# Patient Record
Sex: MALE | Race: WHITE | NOT HISPANIC OR LATINO | URBAN - METROPOLITAN AREA
[De-identification: names, ages, dates, MRNs, and addresses within clinical notes are randomized per-mention and may not be internally consistent; named-entity substitution may affect disease eponyms.]

---

## 2018-08-12 ENCOUNTER — HOSPITAL ENCOUNTER (EMERGENCY)
Facility: MEDICAL CENTER | Age: 28
End: 2018-08-14
Attending: EMERGENCY MEDICINE
Payer: MEDICAID

## 2018-08-12 DIAGNOSIS — T14.91XA SUICIDE ATTEMPT (HCC): ICD-10-CM

## 2018-08-12 DIAGNOSIS — R45.851 SUICIDAL IDEATION: ICD-10-CM

## 2018-08-12 PROCEDURE — 90791 PSYCH DIAGNOSTIC EVALUATION: CPT

## 2018-08-12 PROCEDURE — 302970 POC BREATHALIZER: Performed by: EMERGENCY MEDICINE

## 2018-08-12 PROCEDURE — 99285 EMERGENCY DEPT VISIT HI MDM: CPT

## 2018-08-12 RX ORDER — LITHIUM CARBONATE 300 MG
300 TABLET ORAL 3 TIMES DAILY
Status: DISCONTINUED | OUTPATIENT
Start: 2018-08-13 | End: 2018-08-13

## 2018-08-12 RX ORDER — RISPERIDONE 1 MG/1
1 TABLET ORAL 2 TIMES DAILY
Status: DISCONTINUED | OUTPATIENT
Start: 2018-08-12 | End: 2018-08-13

## 2018-08-13 LAB
AMPHET UR QL SCN: NEGATIVE
BARBITURATES UR QL SCN: NEGATIVE
BENZODIAZ UR QL SCN: NEGATIVE
BZE UR QL SCN: NEGATIVE
CANNABINOIDS UR QL SCN: NEGATIVE
LITHIUM SERPL-MCNC: <0.1 MMOL/L (ref 0.6–1.2)
METHADONE UR QL SCN: NEGATIVE
OPIATES UR QL SCN: NEGATIVE
OXYCODONE UR QL SCN: NEGATIVE
PCP UR QL SCN: NEGATIVE
POC BREATHALIZER: 0.02 PERCENT (ref 0–0.01)
PROPOXYPH UR QL SCN: NEGATIVE

## 2018-08-13 PROCEDURE — 80307 DRUG TEST PRSMV CHEM ANLYZR: CPT

## 2018-08-13 PROCEDURE — A9270 NON-COVERED ITEM OR SERVICE: HCPCS | Performed by: EMERGENCY MEDICINE

## 2018-08-13 PROCEDURE — 80178 ASSAY OF LITHIUM: CPT

## 2018-08-13 PROCEDURE — 700102 HCHG RX REV CODE 250 W/ 637 OVERRIDE(OP): Performed by: EMERGENCY MEDICINE

## 2018-08-13 PROCEDURE — 99245 OFF/OP CONSLTJ NEW/EST HI 55: CPT | Performed by: PSYCHIATRY & NEUROLOGY

## 2018-08-13 RX ORDER — CLONIDINE HYDROCHLORIDE 0.1 MG/1
0.1 TABLET ORAL DAILY
COMMUNITY
End: 2018-08-13

## 2018-08-13 RX ORDER — FLUOXETINE HYDROCHLORIDE 20 MG/1
40 CAPSULE ORAL 2 TIMES DAILY
COMMUNITY
End: 2018-08-13

## 2018-08-13 RX ORDER — RISPERIDONE 2 MG/1
4 TABLET ORAL EVERY EVENING
Status: DISCONTINUED | OUTPATIENT
Start: 2018-08-13 | End: 2018-08-14

## 2018-08-13 RX ORDER — LITHIUM CARBONATE 300 MG
400 TABLET ORAL 2 TIMES DAILY
COMMUNITY
End: 2018-08-13

## 2018-08-13 RX ORDER — PRAZOSIN HYDROCHLORIDE 1 MG/1
2 CAPSULE ORAL NIGHTLY
COMMUNITY
End: 2018-08-13

## 2018-08-13 RX ORDER — RISPERIDONE 2 MG/1
2 TABLET ORAL
COMMUNITY
End: 2018-08-13

## 2018-08-13 RX ORDER — FLUOXETINE HYDROCHLORIDE 20 MG/1
60 CAPSULE ORAL DAILY
Status: DISCONTINUED | OUTPATIENT
Start: 2018-08-13 | End: 2018-08-14

## 2018-08-13 RX ORDER — CLONIDINE HYDROCHLORIDE 0.1 MG/1
0.1 TABLET ORAL
Status: DISCONTINUED | OUTPATIENT
Start: 2018-08-13 | End: 2018-08-14 | Stop reason: HOSPADM

## 2018-08-13 RX ORDER — LITHIUM CARBONATE 300 MG
600 TABLET ORAL 2 TIMES DAILY
Status: DISCONTINUED | OUTPATIENT
Start: 2018-08-13 | End: 2018-08-14

## 2018-08-13 RX ORDER — IBUPROFEN 600 MG/1
600 TABLET ORAL ONCE
Status: COMPLETED | OUTPATIENT
Start: 2018-08-13 | End: 2018-08-13

## 2018-08-13 RX ORDER — PRAZOSIN HYDROCHLORIDE 2 MG/1
2 CAPSULE ORAL EVERY EVENING
Status: DISCONTINUED | OUTPATIENT
Start: 2018-08-13 | End: 2018-08-13

## 2018-08-13 RX ADMIN — RISPERIDONE 4 MG: 2 TABLET ORAL at 18:18

## 2018-08-13 RX ADMIN — LITHIUM CARBONATE 600 MG: 300 TABLET ORAL at 18:17

## 2018-08-13 RX ADMIN — CLONIDINE HYDROCHLORIDE 0.1 MG: 0.1 TABLET ORAL at 01:09

## 2018-08-13 RX ADMIN — IBUPROFEN 600 MG: 600 TABLET, FILM COATED ORAL at 22:00

## 2018-08-13 RX ADMIN — CLONIDINE HYDROCHLORIDE 0.1 MG: 0.1 TABLET ORAL at 21:00

## 2018-08-13 RX ADMIN — FLUOXETINE HYDROCHLORIDE 60 MG: 20 CAPSULE ORAL at 01:09

## 2018-08-13 RX ADMIN — PRAZOSIN HYDROCHLORIDE 6 MG: 5 CAPSULE ORAL at 18:19

## 2018-08-13 ASSESSMENT — PAIN SCALES - GENERAL: PAINLEVEL_OUTOF10: 6

## 2018-08-13 NOTE — DISCHARGE PLANNING
Medical Social Work    Referral: Legal Hold    Intervention: Legal Hold Paperwork given to SW by AMEEW: Viridiana    Legal Hold Initiated: Date: 08/12/2018  Time: 1132    Legal Hold faxed: Date: 08/13/2018  Time: 1322    Patient’s Insurance Listed on Face Sheet: None    Referrals sent to: [facility]    Plan: Patient will transfer to mental health facility once acceptance is obtained.     Confirmation of receipt of referral by phone with: fax confirmation

## 2018-08-13 NOTE — ED PROVIDER NOTES
ED Provider Note    Scribed for Augustine Weiner M.D. by Augustine Weiner. 8/12/2018,  11:42 PM.    CHIEF COMPLAINT  Chief Complaint   Patient presents with   • Suicidal Ideation   • Suicide Attempt       HPI  Myles Schultz is a 27 y.o. male with a history of severe psychiatric illness and depression  who is been off his lithium and Risperdal for 2 weeks because he did not think they were working who presents to the Emergency Department accompanied by his mother, stating that he had a clear plan to kill himself by trying to hang himself with an extension cord.  He denies wanting to hurt anyone else.  He denies any hallucinations.  He appears extremely depressed.  He is not making eye contact with the psychiatric evaluator or with me.  He replies to questions and delayed and monosyllabic responses.  He had an unpleasant breakup with a girlfriend who had been living in [facility] for the last 4 years.  He reports worsening depression, and was attempting to hang himself with an extension cord, but he was discovered by his mother who brought him to the hospital.  He continues to feel suicidal.  He has some very subtle red marks around his neck.    REVIEW OF SYSTEMS  See HPI for further details. All other systems are negative.     PAST MEDICAL HISTORY   has a past medical history of Psychiatric disorder.    SOCIAL HISTORY  Social History     Social History Main Topics   • Smoking status: Current Every Day Smoker   • Smokeless tobacco: Never Used   • Alcohol use Yes      Comment: daily 12 pack   • Drug use: No   • Sexual activity: Not on file     History   Drug Use No       SURGICAL HISTORY   has a past surgical history that includes appendectomy and other orthopedic surgery.    CURRENT MEDICATIONS  Home Medications    **Home medications have not yet been reviewed for this encounter**         ALLERGIES  No Known Allergies    PHYSICAL EXAM  VITAL SIGNS: /86   Pulse 97   Temp 36.1 °C (97 °F)   Resp  "16   Ht 1.88 m (6' 2\")   Wt 117.1 kg (258 lb 2.5 oz)   SpO2 98%   BMI 33.15 kg/m²   Pulse ox interpretation: I interpret this pulse ox as normal.      PHYSICAL EXAM  VITAL SIGNS: /86   Pulse 97   Temp 36.1 °C (97 °F)   Resp 16   Ht 1.88 m (6' 2\")   Wt 117.1 kg (258 lb 2.5 oz)   SpO2 98%   BMI 33.15 kg/m²   Pulse ox interpretation: I interpret this pulse ox as normal.  Constitutional: Alert in no apparent distress.  Appears severely depressed.  HENT: No signs of trauma, Bilateral external ears normal, Nose normal.   Eyes: Pupils are equal and reactive, Conjunctiva normal, Non-icteric.   Neck: Normal range of motion, No tenderness, Supple, No stridor.    Cardiovascular: Normal peripheral perfusion  Thorax & Lungs: Unlabored respirations, equal chest expansion, no accessory muscle use  Abdomen: Non-distended  Skin:  No erythema, No rash.   Back: Normal alignment and ROM  Extremities: No gross deformity  Musculoskeletal: Good range of motion in all major joints.   Neurologic: Alert, Normal motor function, No focal deficits noted.   Psychiatric: Affect flat, withdrawn, depressed, judgment fair, no homicidal thoughts.  No hallucinations or delusions.      DIAGNOSTIC STUDIES / PROCEDURES      LABS  Labs Reviewed   LITHIUM   URINE DRUG SCREEN   POC BREATHALIZER     All labs reviewed by me.    RADIOLOGY  No orders to display     The radiologist's interpretation of all radiological studies have been reviewed by me.    COURSE & MEDICAL DECISION MAKING  Nursing notes, VS, PMSFHx reviewed in chart.     11:42 PM Patient seen and examined at bedside.  We have confirmed his home medication doses over the phone with his mother who reviewed the bottles of the patient's home medications.  He is obviously severely depressed and off his medications and maintains continued suicidal thoughts.  He will be kept safe here until he can be transferred to inpatient psychiatric treatment.    DISPOSITION:  Patient will be " transferred to my partner Dr. Alfredo Brennan for continued observation and safety until he can be transferred to inpatient treatment.      FINAL IMPRESSION  1. Suicidal ideation    2. Suicide attempt (HCC)

## 2018-08-13 NOTE — CONSULTS
"[facility]   TRIAGE ASSESSMENT    Name: Myles Schultz  MRN: [id number]  : 1990  Age: 27 y.o.  Date of assessment: 2018  PCP: Carlitos Richter M.D.  Persons in attendance: Patient    CHIEF COMPLAINT/PRESENTING ISSUE (as stated by Patient): This is a 27 year old male seen seated on hospital chair, slumped over, no eye contact, with  impoverished speech, and flat affect. Patient reports experiencing depression for years. Patient states, \"nothing was getting better\". Patient reports going through a break up with his fiance two weeks ago. Patient states his former fiance \"forced me to pack up my things in a [employer], and flew my other out and forced her to drive me back to [place]\".  Patient reports severe depression and tonight attempted to hang himself with an extension cord but was discovered by his mother who transported him to the hospital. Patient continues to endorse suicidal ideations at this time. Patient has a visible red cooper around his neck. Has not been taking psychotropic medications x 2 weeks. Denies thoughts of harming others.  Patient denies auditory or visual hallucinations.   Chief Complaint   Patient presents with   • Suicidal Ideation   • Suicide Attempt        CURRENT LIVING SITUATION/SOCIAL SUPPORT: Patient resides with his mother. Never , no children. Patient is currently unemployed and has no income.     BEHAVIORAL HEALTH TREATMENT HISTORY  Does patient/parent report a history of prior behavioral health treatment for patient?   Yes:    Dates Level of Care Facilty/Provider Diagnosis/Problem Medications   2018 Outpatient counseling [facility] depression Lithium  Risperdal                                                                          SAFETY ASSESSMENT - SELF  Does patient acknowledge current or past symptoms of dangerousness to self? yes  Does parent/significant other report patient has current or past symptoms of dangerousness to self? N\A  Does " presenting problem suggest symptoms of dangerousness to self? Yes:     Past Current    Suicidal Thoughts: [x]  [x]    Suicidal Plans: []  []    Suicidal Intent: []  []    Suicide Attempts: [x]  [x]    Self-Injury [x]  [x]      For any boxes checked above, provide detail: Patient reports attempting to hang himself today using an extension cord. Patient reports previous attempts to harm self through cutting.     History of suicide by family member: yes - Grandfather hung himself, mother suffers from depression  History of suicide by friend/significant other: no  Recent change in frequency/specificity/intensity of suicidal thoughts or self-harm behavior? yes -   Current access to firearms, medications, or other identified means of suicide/self-harm? yes -   If yes, willing to restrict access to means of suicide/self-harm? yes -   Protective factors present:  Willing to address in treatment    SAFETY ASSESSMENT - OTHERS  Does patient acknowledge current or past symptoms of aggressive behavior or risk to others? no  Does parent/significant other report patient has current or past symptoms of aggressive behavior or risk to others?  N\A  Does presenting problem suggest symptoms of dangerousness to others? No    Crisis Safety Plan completed and copy given to patient? no    ABUSE/NEGLECT SCREENING  Does patient report feeling “unsafe” in his/her home, or afraid of anyone?  no  Does patient report any history of physical, sexual, or emotional abuse?  no  Does parent or significant other report any of the above? N\A  Is there evidence of neglect by self?  no  Is there evidence of neglect by a caregiver? no  Does the patient/parent report any history of CPS/APS/police involvement related to suspected abuse/neglect or domestic violence? no  Based on the information provided during the current assessment, is a mandated report of suspected abuse/neglect being made?  No    SUBSTANCE USE SCREENING  Yes:  Kj all substances used in  "the past 30 days:      Last Use Amount   [x]   Alcohol today    []   Marijuana     []   Heroin     []   Prescription Opioids  (used without prescription, for    recreation, or in excess of prescribed amount)     []   Other Prescription  (used without prescription, for    recreation, or in excess of prescribed amount)     []   Cocaine      []   Methamphetamine     []   \"\" drugs (ectasy, MDMA)     []   Other substances        UDS results: pending  Breathalyzer results: pending    What consequences does the patient associate with any of the above substance use and or addictive behaviors? None    Risk factors for detox (check all that apply):  []  Seizures   []  Diaphoretic (sweating)   []  Tremors   []  Hallucinations   []  Increased blood pressure   []  Decreased blood pressure   []  Other   [x]  None      [x] Patient education on risk factors for detoxification and instructed to return to ER as needed.      MENTAL STATUS   Participation: Limited verbal participation  Grooming: Casual  Orientation: Alert  Behavior: Calm  Eye contact: Poor  Mood: Depressed  Affect: Flat  Thought process: Goal-directed  Thought content: Within normal limits  Speech: Soft , latency in speech  Perception: Within normal limits  Memory:  No gross evidence of memory deficits  Insight: Limited  Judgment:  Limited  Other:    Collateral information:   Source:  [] Significant other present in person:   [x] Significant other by telephone  Mother Mariluz [phone]  [] [facility]   [x] [facility]  [x] [facility]  [] Other:     [] Unable to complete full assessment due to:  [] Acute intoxication  [] Patient declined to participate/engage  [] Patient verbally unresponsive  [] Significant cognitive deficits  [] Significant perceptual distortions or behavioral disorganization  [] Other:      CLINICAL IMPRESSIONS:  Primary:  Major Depression  Secondary:  deferred       IDENTIFIED NEEDS/PLAN:  [Trigger " DISPOSITION list for any items marked]    [x]  Imminent safety risk - self [] Imminent safety risk - others   []  Acute substance withdrawal []  Psychosis/Impaired reality testing   []  Mood/anxiety []  Substance use/Addictive behavior   []  Maladaptive behaviro []  Parent/child conflict   []  Family/Couples conflict []  Biomedical   []  Housing []  Financial   []   Legal  Occupational/Educational   []  Domestic violence []  Other:     Disposition: Refer to Legal [facility], [facility], [facility], [facility] and [facility]    Does patient express agreement with the above plan? yes    Referral appointment(s) scheduled? N\A    Alert team only:   I have discussed findings and recommendations with Dr. Weiner who is in agreement with these recommendations.     Referral information sent to the following community providers :    If applicable : Referred  to : [facility] for legal hold follow up at (time): 12:20 abrock Brown, Ph.D.  8/12/2018

## 2018-08-13 NOTE — ED NOTES
Personal belongings checked at bedside by security. Cigarettes and lighter confiscated. One bag of belongings placed in locker 10.

## 2018-08-13 NOTE — ED TRIAGE NOTES
"Pt states \"want to kill myself just tried by hanging myself with extension cord\"  Pt denies hi, a/v hallucinations, second attempt.  Has been off med's lithium and Risperdal for 2 weeks, \" didn't believe they were working\". Pt c/o of high anxiety at this time.  Airway patent red marks right left side around collar.  Mom kranthi in triage with pt, with permission and also has permission to call in and get updates..  Pt tearful at time, answers questions appropriately withdrawn  "

## 2018-08-13 NOTE — ED NOTES
Med Rec completed per patient and [facility] in [place]  Allergies reviewed  No ORAL antibiotics in last 30 days    Patient has been of his medications for a couple weeks but called pharmacy for information:  Clonidine 0.1mg once daily at bedtime  Fluoxitine 20 mg take 60 mg once daily  Lithium 300 mg take 2 capsules twice daily  Prazosin 1 mg and 5 mg to take a total of 6 mg at bedtime  Risperidone 4 mg once daily at bedtime

## 2018-08-13 NOTE — DISCHARGE PLANNING
Alert Team Note: Per patient's permission, contacted mother Liliana at [phone] who confirmed patient's history. Received list of home medications from mother with dosages, informed medical staff.  Patient awaits psychiatric hospitalization    Viridiana Brown, Ph.D.  Alert Team Therapist

## 2018-08-13 NOTE — ED NOTES
Patient's home medications have been reviewed by the pharmacy team.     Past Medical History:   Diagnosis Date   • Psychiatric disorder        Patient's Medications   New Prescriptions    No medications on file   Previous Medications    No medications on file   Modified Medications    No medications on file   Discontinued Medications    CLONIDINE (CATAPRES) 0.1 MG TAB    Take 0.1 mg by mouth every day.    FLUOXETINE (PROZAC) 20 MG CAP    Take 40 mg by mouth 2 times a day.    LITHIUM (ESKALITH) 300 MG TAB    Take 400 mg by mouth 2 Times a Day.    PRAZOSIN (MINIPRESS) 1 MG CAP    Take 2 mg by mouth every evening.    RISPERIDONE (RISPERDAL) 2 MG TAB    Take 2 mg by mouth every bedtime.          A:  Patient states he has been out of medications for the past 2 weeks.  Medications do not appear to be contributing to current complaints.       P:    No recommendations at this time. Home medications have been reordered as appropriate.  Discussed with Dr. Looney regarding home does of prazosin, ok to increase to home dosing regimen of prazosin 6 mg po QHS.      Chiquis Odom, EdwinD., BCPS

## 2018-08-13 NOTE — ED PROVIDER NOTES
ED Provider Note    11:10 AM patient care signed out from nighttime ERP.  No events under my care.  Pharmacist has reconciled and altered the patient's medications due to a dosing discrepancy.  Patient sitting up, he appears comfortable.  He has no requests and no complaints.  I encouraged him to make his needs known.  Sitter outside the room.  Patient care will be transferred to oncoming ERP at close of my shift.

## 2018-08-14 VITALS
TEMPERATURE: 98.1 F | OXYGEN SATURATION: 95 % | HEART RATE: 64 BPM | WEIGHT: 258.16 LBS | BODY MASS INDEX: 33.13 KG/M2 | DIASTOLIC BLOOD PRESSURE: 87 MMHG | RESPIRATION RATE: 16 BRPM | HEIGHT: 74 IN | SYSTOLIC BLOOD PRESSURE: 125 MMHG

## 2018-08-14 PROCEDURE — A9270 NON-COVERED ITEM OR SERVICE: HCPCS | Performed by: PSYCHIATRY & NEUROLOGY

## 2018-08-14 PROCEDURE — 99284 EMERGENCY DEPT VISIT MOD MDM: CPT | Performed by: PSYCHIATRY & NEUROLOGY

## 2018-08-14 PROCEDURE — A9270 NON-COVERED ITEM OR SERVICE: HCPCS | Performed by: EMERGENCY MEDICINE

## 2018-08-14 PROCEDURE — 700102 HCHG RX REV CODE 250 W/ 637 OVERRIDE(OP): Performed by: EMERGENCY MEDICINE

## 2018-08-14 PROCEDURE — 700102 HCHG RX REV CODE 250 W/ 637 OVERRIDE(OP): Performed by: PSYCHIATRY & NEUROLOGY

## 2018-08-14 RX ORDER — FLUOXETINE HYDROCHLORIDE 20 MG/1
20 CAPSULE ORAL DAILY
Status: DISCONTINUED | OUTPATIENT
Start: 2018-08-15 | End: 2018-08-14 | Stop reason: HOSPADM

## 2018-08-14 RX ORDER — QUETIAPINE FUMARATE 100 MG/1
100 TABLET, FILM COATED ORAL EVERY EVENING
Status: DISCONTINUED | OUTPATIENT
Start: 2018-08-14 | End: 2018-08-14 | Stop reason: HOSPADM

## 2018-08-14 RX ORDER — QUETIAPINE FUMARATE 25 MG/1
50 TABLET, FILM COATED ORAL 2 TIMES DAILY
Status: DISCONTINUED | OUTPATIENT
Start: 2018-08-14 | End: 2018-08-14 | Stop reason: HOSPADM

## 2018-08-14 RX ADMIN — QUETIAPINE FUMARATE 50 MG: 25 TABLET ORAL at 15:38

## 2018-08-14 RX ADMIN — FLUOXETINE HYDROCHLORIDE 60 MG: 20 CAPSULE ORAL at 10:08

## 2018-08-14 RX ADMIN — QUETIAPINE FUMARATE 100 MG: 100 TABLET ORAL at 19:18

## 2018-08-14 RX ADMIN — LITHIUM CARBONATE 600 MG: 300 TABLET ORAL at 10:06

## 2018-08-14 ASSESSMENT — LIFESTYLE VARIABLES: DO YOU DRINK ALCOHOL: NO

## 2018-08-14 NOTE — ED NOTES
Patient asleep, no acute distress noted at this time, rr equal and unlabored, sitter outside room with view of patient

## 2018-08-14 NOTE — ED NOTES
no changes, pt remains harm free, pt is sleeping in bed with visible chest rise and falls. Sitter outside room.

## 2018-08-14 NOTE — PSYCHIATRY
"PSYCHIATRIC CONSULTATION:  *Reason for admission:history of severe psychiatric illness and depression  who is been off his lithium and Risperdal for 2 weeks because he did not think they were working who presents to the Emergency Department accompanied by his mother, stating that he had a clear plan to kill himself by trying to hang himself with an extension cord.      *Reason for consult:suicidal   *Requesting Physician:Augustine Weiner M.D.      Legal status:  +    *Chief Complaint: suicidal    *HPI: 28 yo male with depression for years but much worse 2 weeks ago after breakup with GF because of his depression. He now has decrease appetite with 10 lb wt loss, difficulty getting to sleep, ruminates, guilt, decreased energy, hopeless, anhedonia and is SI but not HI.  Depression is a 10.  He is anxious \"all the time\" and has unpredictable panic attacks with SOB, sweating, CP. No psychosis.        Was preparing to hang when mom walked in. Thinking about it for days.     *Medical Review of Systems: as reported by pt. All systems reviewed. Only those found to be + are noted below. All others are negative.   Neurological:    TBIs:denies   SZs:denies   Strokes:denies  Other medical symptoms:lower back pain from an injury. Ibuprofen 800 mg does not work.      *Past Medical Hx: as noted.    *Family Medical/Psychiatric Hx:depression and SAs and SI'ds. Alcohol. No drugs.    *Past Psychiatric Hx:no hospitalizations, hx of bipolar dx but pt cannot provide symptoms of sam in past. Denies hx of psychosis. Doesn't think current meds work. Has been lithium, risperdol. Denies SAs until now.    *Social Hx:currently living with mom. Some college. Was working but when he broke up, he came to [employer] and his GF turned in his uniform and thus \"made me quit\".    *Drug/Alcohol/Tobacco Hx:   Drugs:denies   Alcohol:12 pack daily   Tobacco:+    *Psychiatric (Physical) Examination: observed phenomenon:  *Vitals:Blood pressure 128/79, pulse " "76, temperature 36.9 °C (98.4 °F), resp. rate 16, height 1.88 m (6' 2\"), weight 117.1 kg (258 lb 2.5 oz), SpO2 98 %.  *Appearance/Attitude:clean, good eye contact, barely cooperative. Normal habitus.  *Muscle Strength/abnormal movements: none, little spontaneous movement  *Tone/Gait/Station:not observed  *Speech:answers only what is asked, paucity  *Thought Process/Associations: linear  *Abnormal/Psychotic Thoughts (ex): none, denies  *Insight/Judgement:fair  *Orientation: x 4  *Memory:intact  *Attention/Concentration:intact  *Language: paucity, fluid  *Fund of Knowledge:not tested  *Mood: depressed, anxious  *Affect: blunted    *SI/HI:  +SI, neg HI    Medical Hx: labs, MARS, medications, etc were reviewed. Only those findings of potential interest to psychiatry are noted below:  Medical Conditions:  None acutely   Allergies: Patient has no known allergies.    Medications (currently prescribed at [facility]):clondine 0.1 hs, prozac 60 mg, lithium 600 mg bid, prozosin 6 mh hs, risperdol 4 mg hs  Labs:Results for KEYSHA HAMMOND (MRN [id number]) as of 8/14/2018 10:06   Ref. Range 8/13/2018 04:32   POC Breathalizer Latest Ref Range: 0.00 - 0.01 Percent 0.022 (A)   UDS negative  Results for KEYSHA HAMMOND (MRN [id number]) as of 8/14/2018 10:06   Ref. Range 8/13/2018 00:20   Lithium Latest Ref Range: 0.6 - 1.2 mmol/L <0.1 (L)     ECG:none     *ASSESSMENT: ( acute, chronic, acute on chronic, complicated, stable, resolved)  Major Depressive disorder Unsepc: severe. R/O bipolar depression without psychosis  Anxiety disorder Unsepc: R/O secondary to depression  Alcohol Use Disorder: moderate to severe.    *Plan/Further Workup: dc lithium, and risperdol. Start lexapro 10 mg, decrease prozac (cross taper) to 20 mg for now, start seroquel 50 mg bid and 100 mg hs for anxiety and sleep.    Legal status: +       Eligible for transfer to [place] [id number] (ED only): YES     Will follow   Thank you for the consult.     "

## 2018-08-14 NOTE — ED NOTES
Assumed care of patient, report from Omid KING. [facility] at bedside[facility]

## 2018-08-14 NOTE — DISCHARGE PLANNING
Alert Team  Pt minimal with answers; denies all psych symptoms.  Continues to await inpatient psychiatric placement.

## 2018-08-14 NOTE — PSYCHIATRY
"PSYCHIATRIC FOLLOW-UP:  *Reason for admission:history of severe psychiatric illness and depression  who is been off his lithium and Risperdal for 2 weeks because he did not think they were working who presents to the Emergency Department accompanied by his mother, stating that he had a clear plan to kill himself by trying to hang himself with an extension cord.       Legal Hold Status: +    Slept. No side effects. Wants to go home because he will be better at home. The reason he is better is because he \"needed a break\". Asked from what? No clear reply. He hesitated when asked about SI. Speaks only if asked and then with as few words as possible.    *Psychiatric (Physical) Examination: observed phenomenon:  *Vitals:Blood pressure 135/78, pulse 66, temperature 36.6 °C (97.8 °F), resp. rate 12, height 1.88 m (6' 2\"), weight 117.1 kg (258 lb 2.5 oz), SpO2 97 %.  *Appearance/Attitude:clean, good eye contact, barely cooperative. Normal habitus.  *Muscle Strength/abnormal movements: none, little spontaneous movement  *Tone/Gait/Station:not observed  *Speech:answers only what is asked, paucity  *Thought Process/Associations: linear  *Abnormal/Psychotic Thoughts (ex): none, denies  *Insight/Judgement:fair  *Orientation: x 4  *Memory:intact  *Attention/Concentration:intact  *Language: paucity, fluid  *Fund of Knowledge:not tested  *Mood: depressed, anxious though presumably less so  *Affect: blunted    *SI/HI:  neg SI, neg HI     *ASSESSMENT: ( acute, chronic, acute on chronic, complicated, stable, resolved)  Major Depressive disorder Unsepc: severe. R/O bipolar depression without psychosis  Anxiety disorder Unsepc: R/O secondary to depression  Alcohol Use Disorder: moderate to severe.     *Plan/Further Workup: pt is not appreciably better.   Legal status: extended   Eligible for transfer to [place] [address] (ED only): yes. Sitter in room.     Will follow   "

## 2018-08-14 NOTE — ED PROVIDER NOTES
ED Provider Note Addendum    Scribed for Pancho Lozano M.D. by Nichole Thomas on 8/13/2018 at 6:03 PM.     This is an addendum to the note on Myles Schultz.  For further details and full chart information, see patient's initial note.       12:00 PM - I discussed the patient's case with Dr. Looney ([facility]) who will transfer care of the patient to me at this time.        6:03 PM  - Patient evaluated by myself.  Patient is resting comfortably at this time with no complaints. He does not want water at this time.     Disposition:  Patient will be transferred to an appropriate psychiatric facility in stable condition for further psychiatric care and evaluation.  Patient will be placed under the care of my partner awaiting transfer.    FINAL IMPRESSION  1. Suicidal ideation    2. Suicide attempt (HCC)         Nichole YO (Scribe), am scribing for, and in the presence of, Pancho Lozano M.D..    Electronically signed by: Nichole Thomas (Scribe), 8/13/2018    Pancho YO M.D. personally performed the services described in this documentation, as scribed by Nichole Thomas in my presence, and it is both accurate and complete.    The note accurately reflects work and decisions made by me.  Pancho Lozano  8/13/2018  8:05 PM

## 2018-08-14 NOTE — ED PROVIDER NOTES
1215 -patient reevaluated.  Patient is on a legal hold, waiting transfer to psychiatric facility when a bed is available.  Please refer to initial note for complete details.  No concerns overnight.  Patient has tolerated breakfast and ambulates independently to the bathroom.  Nursing staff has heard from [facility], anticipate admission later this afternoon.    FINAL IMPRESSION  (R45.851) Suicidal ideation  (T14.91XA) Suicide attempt (HCC)      Electronically signed by: Susy Horowitz, 8/14/2018 1:13 PM    This dictation was created using voice recognition software. The accuracy of the dictation is limited to the abilities of the software. I expect there may be some errors of grammar and possibly content. The nursing notes were reviewed and certain aspects of this information were incorporated into this note.

## 2018-08-14 NOTE — ED NOTES
Received report and assumed care of pt. Pt sleeping, respirations even and unlabored. 1:1 sitter monitoring pt, room stripped of all medically unnecessary and dangerous equpiment.

## 2018-08-14 NOTE — ED NOTES
Resting in bed. Continues to present with flat affect and responds to questions with only one-two words. Denies needs at this time but continues to refuse lunch. Medicated per MAR and he did drink full glass of water with pills. VSS.

## 2018-08-15 NOTE — ED NOTES
[facility] here for transfer. Given transfer paperwork and patient belongings (one bag with clothes, wallet and cell phone). Called mother, Mariluz, and updated on plan for transfer to [facility]. Ambulatory to ambulance bay with EMS crew.

## 2018-08-15 NOTE — ED NOTES
Assist RN: patient is sleeping w/ visible rise and fall of chest observed, even, unlabored respirations at 16. Sitter in direct view of patient.

## 2019-09-26 NOTE — DISCHARGE PLANNING
Medical Social Work    Referral: Legal hold Transfer to Mental Health Facility    Intervention: NADIA received call from Lucia at [facility] stating that Dr. Swain has accepted the patient for admission.     NADIA arranged for transportation to be set up through [facility]    The pt will be picked up at 2000.     NADIA notified the RN of the departure time as well as accepting facility.     NADIA created transfer packet and placed on chart. Original Legal Hold placed in packet.     Plan: Pt will transfer to [facility] at 2000.    skill demonstration/verbal instruction/written material
